# Patient Record
(demographics unavailable — no encounter records)

---

## 2025-03-05 NOTE — HISTORY OF PRESENT ILLNESS
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  She was on HRT for 2 to 3 years after and then stopped taking it. [Mammogramdate] : 1/23/2025 [PapSmeardate] : 3/5/2025 [BoneDensityDate] : 2/5/2025 [PGHxTotal] : 3 [[facility]] : 3 [[facility]] : 3 [Post-Menopause, No Sxs] : post-menopausal, currently without symptoms [Previously active] : previously active [Men] : men

## 2025-03-05 NOTE — PHYSICAL EXAM
[Chaperone Present] : A chaperone was present in the examining room during all aspects of the physical examination [FreeTextEntry2] : Desi [Examination Of The Breasts] : a normal appearance [No Masses] : no breast masses were palpable [Labia Majora] : normal [Labia Minora] : normal [Normal] : normal [Uterine Adnexae] : non-palpable